# Patient Record
Sex: MALE | Race: ASIAN | NOT HISPANIC OR LATINO | Employment: FULL TIME | URBAN - METROPOLITAN AREA
[De-identification: names, ages, dates, MRNs, and addresses within clinical notes are randomized per-mention and may not be internally consistent; named-entity substitution may affect disease eponyms.]

---

## 2017-12-11 ENCOUNTER — RADIANT APPOINTMENT (OUTPATIENT)
Dept: GENERAL RADIOLOGY | Facility: CLINIC | Age: 27
End: 2017-12-11
Attending: PHYSICIAN ASSISTANT
Payer: COMMERCIAL

## 2017-12-11 ENCOUNTER — OFFICE VISIT (OUTPATIENT)
Dept: URGENT CARE | Facility: URGENT CARE | Age: 27
End: 2017-12-11
Payer: COMMERCIAL

## 2017-12-11 VITALS
OXYGEN SATURATION: 95 % | HEART RATE: 104 BPM | SYSTOLIC BLOOD PRESSURE: 131 MMHG | BODY MASS INDEX: 26.88 KG/M2 | DIASTOLIC BLOOD PRESSURE: 75 MMHG | WEIGHT: 156.6 LBS | TEMPERATURE: 97.8 F

## 2017-12-11 DIAGNOSIS — S59.901A ELBOW INJURY, RIGHT, INITIAL ENCOUNTER: ICD-10-CM

## 2017-12-11 DIAGNOSIS — S69.92XA WRIST INJURY, LEFT, INITIAL ENCOUNTER: ICD-10-CM

## 2017-12-11 DIAGNOSIS — S69.92XA WRIST INJURY, LEFT, INITIAL ENCOUNTER: Primary | ICD-10-CM

## 2017-12-11 PROCEDURE — 73070 X-RAY EXAM OF ELBOW: CPT | Mod: RT

## 2017-12-11 PROCEDURE — 99213 OFFICE O/P EST LOW 20 MIN: CPT | Performed by: PHYSICIAN ASSISTANT

## 2017-12-11 PROCEDURE — 73110 X-RAY EXAM OF WRIST: CPT | Mod: LT

## 2017-12-11 RX ORDER — HYDROCODONE BITARTRATE AND ACETAMINOPHEN 5; 325 MG/1; MG/1
1 TABLET ORAL EVERY 6 HOURS PRN
Qty: 12 TABLET | Refills: 0 | Status: SHIPPED | OUTPATIENT
Start: 2017-12-11 | End: 2018-01-11

## 2017-12-11 RX ORDER — NAPROXEN 500 MG/1
500 TABLET ORAL 2 TIMES DAILY PRN
Qty: 30 TABLET | Refills: 0 | Status: SHIPPED | OUTPATIENT
Start: 2017-12-11 | End: 2023-01-08

## 2017-12-11 NOTE — PROGRESS NOTES
SUBJECTIVE:   Dilia Garcia is a 27 year old male who presents to clinic today for the following health issues:      Arm Pain      Duration: today    Description (location/character/radiation): right arm    Intensity:  moderate    Accompanying signs and symptoms: right arm pain, difficulty moving arm    History (similar episodes/previous evaluation): None    Precipitating or alleviating factors: None    Therapies tried and outcome: None      Slipped in the kitchen this AM. Left writs and R elbow pain. Last tetanus 2015      No Known Allergies    No past medical history on file.      Current Outpatient Prescriptions on File Prior to Visit:  order for DME Equipment being ordered: ankle brace-gel   order for DME Equipment being ordered: crutches   HYDROcodone-acetaminophen (NORCO) 5-325 MG per tablet Take 1 tablet by mouth every 6 hours as needed for moderate to severe pain   ibuprofen (ADVIL,MOTRIN) 400 MG tablet Take 1 tablet (400 mg) by mouth every 6 hours as needed for moderate pain   cyclobenzaprine (FLEXERIL) 10 MG tablet Take 0.5-1 tablets (5-10 mg) by mouth 3 times daily as needed for muscle spasms     No current facility-administered medications on file prior to visit.     Social History   Substance Use Topics     Smoking status: Never Smoker     Smokeless tobacco: Never Used     Alcohol use No       ROS:  Gen: np fevers  Musculoskel: + as above  Skin: as above    OBJECTIVE:  /75 (BP Location: Left arm, Patient Position: Chair, Cuff Size: Adult Regular)  Pulse 104  Temp 97.8  F (36.6  C) (Oral)  Wt 156 lb 9.6 oz (71 kg)  SpO2 95%  BMI 26.88 kg/m2   General:   awake, alert, and cooperative.  NAD.   Head: Normocephalic, atraumatic.  Eyes: Conjunctiva clear,   MS:  Left wrist with tenderness mid dorsal wrist. Moderate swelling. No snuff box tenderness. Mild decreased ROM all planes with pain. Circ./sensation intact/ Small 1cm superficial abrasion ulnar side.  Right elbow- no swelling. Tender volar  cubital fossa  Neuro: Alert and oriented - normal speech.    xrays-  I see no obvious fracture of wrist and elbow.    ASSESSMENT:      ICD-10-CM    1. Wrist injury, left, initial encounter S69.92XA XR Wrist Left G/E 3 Views     order for DME     naproxen (NAPROSYN) 500 MG tablet     HYDROcodone-acetaminophen (NORCO) 5-325 MG per tablet   2. Elbow injury, right, initial encounter S59.901A XR Elbow Right 2 Views     order for DME     naproxen (NAPROSYN) 500 MG tablet     HYDROcodone-acetaminophen (NORCO) 5-325 MG per tablet           PLAN: Arm sling and left wrist splint. Ice. Radiol. report pending. F/U PCP if not better 7-10 days  Advised about symptoms which might herald more serious problems.      Teresa Lua PA-C

## 2017-12-11 NOTE — NURSING NOTE
"Chief Complaint   Patient presents with     Musculoskeletal Problem     Patient complains of right arm pain. Patient had fall and injured both arms       Initial /75 (BP Location: Left arm, Patient Position: Chair, Cuff Size: Adult Regular)  Pulse 104  Temp 97.8  F (36.6  C) (Oral)  Wt 156 lb 9.6 oz (71 kg)  SpO2 95%  BMI 26.88 kg/m2 Estimated body mass index is 26.88 kg/(m^2) as calculated from the following:    Height as of 12/7/15: 5' 4\" (1.626 m).    Weight as of this encounter: 156 lb 9.6 oz (71 kg).  Medication Reconciliation: complete       Larisa Curry    "

## 2017-12-11 NOTE — MR AVS SNAPSHOT
"              After Visit Summary   2017    Dilia Garcia    MRN: [id number]           Patient Information     Date Of Birth          1990        Visit Information        Provider Department      2017 11:10 AM Teresa Lua PA-C [facility]        Today's Diagnoses     Wrist injury, left, initial encounter    -  1    Elbow injury, right, initial encounter           Follow-ups after your visit        Who to contact     If you have questions or need follow up information about today's clinic visit or your schedule please contact [facility] directly at [phone].  Normal or non-critical lab and imaging results will be communicated to you by inZairhart, letter or phone within 4 business days after the clinic has received the results. If you do not hear from us within 7 days, please contact the clinic through inZairhart or phone. If you have a critical or abnormal lab result, we will notify you by phone as soon as possible.  Submit refill requests through [employer] or call your pharmacy and they will forward the refill request to us. Please allow 3 business days for your refill to be completed.          Additional Information About Your Visit        MyChart Information     [employer] lets you send messages to your doctor, view your test results, renew your prescriptions, schedule appointments and more. To sign up, go to [email or web address] . Click on \"Log in\" on the left side of the screen, which will take you to the Welcome page. Then click on \"Sign up Now\" on the right side of the page.     You will be asked to enter the access code listed below, as well as some personal information. Please follow the directions to create your username and password.     Your access code is: [email or web address]  Expires: 3/11/2018 12:54 PM     Your access code will  in 90 days. If you need help or a new code, please call your [facility] or [phone].        Care EveryWhere ID "     This is your Care EveryWhere ID. This could be used by other organizations to access your [place] medical records  [id number]        Your Vitals Were     Pulse Temperature Pulse Oximetry BMI (Body Mass Index)          104 97.8  F (36.6  C) (Oral) 95% 26.88 kg/m2         Blood Pressure from Last 3 Encounters:   12/11/17 131/75   12/07/15 128/80   07/31/15 128/87    Weight from Last 3 Encounters:   12/11/17 156 lb 9.6 oz (71 kg)   12/07/15 185 lb (83.9 kg)   07/31/15 190 lb (86.2 kg)                 Today's Medication Changes          These changes are accurate as of: 12/11/17 12:54 PM.  If you have any questions, ask your nurse or doctor.               Start taking these medicines.        Dose/Directions    naproxen 500 MG tablet   Commonly known as:  NAPROSYN   Used for:  Wrist injury, left, initial encounter, Elbow injury, right, initial encounter   Started by:  Teresa Lua PA-C        Dose:  500 mg   Take 1 tablet (500 mg) by mouth 2 times daily as needed for moderate pain   Quantity:  30 tablet   Refills:  0         These medicines have changed or have updated prescriptions.        Dose/Directions    * HYDROcodone-acetaminophen 5-325 MG per tablet   Commonly known as:  NORCO   This may have changed:  Another medication with the same name was added. Make sure you understand how and when to take each.   Used for:  Ankle injury, left, initial encounter   Changed by:  Alondra Brooks PA-C        Dose:  1 tablet   Take 1 tablet by mouth every 6 hours as needed for moderate to severe pain   Quantity:  30 tablet   Refills:  0       * HYDROcodone-acetaminophen 5-325 MG per tablet   Commonly known as:  NORCO   This may have changed:  You were already taking a medication with the same name, and this prescription was added. Make sure you understand how and when to take each.   Used for:  Wrist injury, left, initial encounter, Elbow injury, right, initial encounter   Changed by:  Teresa Lua  REGLA Murray        Dose:  1 tablet   Take 1 tablet by mouth every 6 hours as needed for moderate to severe pain   Quantity:  12 tablet   Refills:  0       * order for DME   This may have changed:  Another medication with the same name was added. Make sure you understand how and when to take each.   Used for:  Ankle injury, left, initial encounter   Changed by:  Alondra Brooks PA-C        Equipment being ordered: ankle brace-gel   Quantity:  1 Device   Refills:  0       * order for DME   This may have changed:  Another medication with the same name was added. Make sure you understand how and when to take each.   Used for:  Ankle injury, left, initial encounter   Changed by:  Alondra Brooks PA-C        Equipment being ordered: crutches   Quantity:  1 Device   Refills:  0       * order for DME   This may have changed:  You were already taking a medication with the same name, and this prescription was added. Make sure you understand how and when to take each.   Used for:  Elbow injury, right, initial encounter   Changed by:  Teresa Lua PA-C        Dose:  1 Device   1 Device by Device route once for 1 dose Arm sling - use as instructed   Quantity:  1 Device   Refills:  0       * order for DME   This may have changed:  You were already taking a medication with the same name, and this prescription was added. Make sure you understand how and when to take each.   Used for:  Wrist injury, left, initial encounter   Changed by:  Teresa Lua PA-C        Dose:  1 Device   1 Device by Device route once for 1 dose Left wrist brace - use as instructed   Quantity:  1 Device   Refills:  0       * Notice:  This list has 6 medication(s) that are the same as other medications prescribed for you. Read the directions carefully, and ask your doctor or other care provider to review them with you.         Where to get your medicines      These medications were sent to [facility] [place] [employer] [facility]  [place] [facility] [address] [address]  [address], [place] [address]     Phone:  [phone]     naproxen 500 MG tablet         Some of these will need a paper prescription and others can be bought over the counter.  Ask your nurse if you have questions.     Bring a paper prescription for each of these medications     HYDROcodone-acetaminophen 5-325 MG per tablet       You don't need a prescription for these medications     order for DME    order for DME                Primary Care Provider Office Phone # Fax #    [facility] [phone]       [address]  [facility] [address]        Equal Access to Services     MICHAEL LOVE : Hadii aad ku hadasho Soomaali, waaxda luqadaha, qaybta kaalmada adeegyada, waxay idiin haywandy santos . So [facility] [phone].    ATENCIÓN: Si habla español, tiene a sullivan disposición servicios gratuitos de asistencia lingüística. [facility] [phone].    We comply with applicable federal civil rights laws and [place] laws. We do not discriminate on the basis of race, color, national origin, age, disability, sex, sexual orientation, or gender identity.            Thank you!     Thank you for choosing [facility]  for your care. Our goal is always to provide you with excellent care. Hearing back from our patients is one way we can continue to improve our services. Please take a few minutes to complete the written survey that you may receive in the mail after your visit with us. Thank you!             Your Updated Medication List - Protect others around you: Learn how to safely use, store and throw away your medicines at www.disposemymeds.org.          This list is accurate as of: 12/11/17 12:54 PM.  Always use your most recent med list.                   Brand Name Dispense Instructions for use Diagnosis    cyclobenzaprine 10 MG tablet    FLEXERIL    30 tablet    Take 0.5-1 tablets (5-10 mg) by mouth 3 times daily as  needed for muscle spasms    LBP (low back pain)       * HYDROcodone-acetaminophen 5-325 MG per tablet    NORCO    30 tablet    Take 1 tablet by mouth every 6 hours as needed for moderate to severe pain    Ankle injury, left, initial encounter       * HYDROcodone-acetaminophen 5-325 MG per tablet    NORCO    12 tablet    Take 1 tablet by mouth every 6 hours as needed for moderate to severe pain    Wrist injury, left, initial encounter, Elbow injury, right, initial encounter       ibuprofen 400 MG tablet    ADVIL/MOTRIN    45 tablet    Take 1 tablet (400 mg) by mouth every 6 hours as needed for moderate pain    LBP (low back pain)       naproxen 500 MG tablet    NAPROSYN    30 tablet    Take 1 tablet (500 mg) by mouth 2 times daily as needed for moderate pain    Wrist injury, left, initial encounter, Elbow injury, right, initial encounter       * order for DME     1 Device    Equipment being ordered: ankle brace-gel    Ankle injury, left, initial encounter       * order for DME     1 Device    Equipment being ordered: crutches    Ankle injury, left, initial encounter       * order for DME     1 Device    1 Device by Device route once for 1 dose Arm sling - use as instructed    Elbow injury, right, initial encounter       * order for DME     1 Device    1 Device by Device route once for 1 dose Left wrist brace - use as instructed    Wrist injury, left, initial encounter       * Notice:  This list has 6 medication(s) that are the same as other medications prescribed for you. Read the directions carefully, and ask your doctor or other care provider to review them with you.

## 2018-01-02 DIAGNOSIS — S69.92XA WRIST INJURY, LEFT, INITIAL ENCOUNTER: ICD-10-CM

## 2018-01-02 DIAGNOSIS — S59.901A ELBOW INJURY, RIGHT, INITIAL ENCOUNTER: ICD-10-CM

## 2018-01-02 NOTE — TELEPHONE ENCOUNTER
Requested Prescriptions   Pending Prescriptions Disp Refills     naproxen (NAPROSYN) 500 MG tablet  Last Written Prescription Date:  12/11/17  Last Fill Quantity: 30,  # refills: 0   Last Office Visit with G, P or [facility] prescribing provider:  12/07/15   Future Office Visit:    30 tablet 0     Sig: Take 1 tablet (500 mg) by mouth 2 times daily as needed for moderate pain    NSAID Medications Failed    1/2/2018  9:14 AM       Failed - Normal ALT on file in past 12 months    No lab results found.         Failed - Normal AST on file in past 12 months    No lab results found.         Failed - Normal CBC on file in past 12 months    No lab results found.         Failed - Normal serum creatinine on file in past 12 months    No lab results found.         Passed - Blood pressure under 140/90    BP Readings from Last 3 Encounters:   12/11/17 131/75   12/07/15 128/80   07/31/15 128/87                Passed - Recent or future visit with authorizing provider's specialty    Patient had office visit in the last year or has a visit in the next 30 days with authorizing provider.  See chart review.              Passed - Patient is age 6-64 years

## 2018-01-02 NOTE — LETTER
January 9, 2018      Dilia Garcia  [address]  [place] [facility] [address]        Dear Dilia Garcia,      The refill request made by your pharmacy was received at the clinic.     Refused Prescriptions:                       Disp   Refills    naproxen (NAPROSYN) 500 MG tablet          30 tab*0        Sig: Take 1 tablet (500 mg) by mouth 2 times daily as           needed for moderate pain  Refused By: DOV CORREIA  Reason for Refusal: Inappropriate, get more info  Reason for Refusal Comment: due for OV prior to additional refills      At this time you are due in the clinic for a follow up appointment. This medication has been denied until a appointment has been scheduled.    Please call your clinic to make an appointment with your provider before you run out of medication. This will prevent a delay in your next month's refill.    [facility] [address] [phone]    For your convenience we also offer online appointment scheduling at [email or web address] or [email or web address].     We invite you to refill your next prescription at a [facility] or take advantage of our prescription mail service.      Sincerely,    Urgent Care Staff with Teresa Lua

## 2018-01-05 RX ORDER — NAPROXEN 500 MG/1
500 TABLET ORAL 2 TIMES DAILY PRN
Qty: 30 TABLET | Refills: 0 | OUTPATIENT
Start: 2018-01-05

## 2018-01-08 NOTE — TELEPHONE ENCOUNTER
This writer attempted to contact Dilia on 01/08/18      Reason for call patient needs to schedule an appointment before medication can refilled, at this time the medication has been denied until an appointment is made and left message to return call to schedule an appointment.      If patient calls back:   Schedule Office Visit appointment with primary care, document that pt called and close encounter .        Collin Cota

## 2018-01-09 NOTE — TELEPHONE ENCOUNTER
This writer attempted to contact Dilia on 01/09/18      Reason for call informed due to be seen and left detailed message.      If patient calls back:   Patient contacted by 1st floor [facility] Team (MA/TC). Inform patient that someone from the team will contact them, document that pt called and route to care team. .    IF PATIENT CALLS BACK PLEASE HELP SCHEDULE FOLLOW UP APPOINTMENT.     Randolph Giron MA

## 2018-01-09 NOTE — TELEPHONE ENCOUNTER
No appointment scheduled, no call back. Letter is mailed to patient's home address to schedule an appointment before medication can be refilled.  Collin Cota,  [facility]

## 2018-01-11 ENCOUNTER — OFFICE VISIT (OUTPATIENT)
Dept: FAMILY MEDICINE | Facility: CLINIC | Age: 28
End: 2018-01-11
Payer: COMMERCIAL

## 2018-01-11 VITALS
DIASTOLIC BLOOD PRESSURE: 81 MMHG | WEIGHT: 159.7 LBS | SYSTOLIC BLOOD PRESSURE: 129 MMHG | TEMPERATURE: 98 F | BODY MASS INDEX: 26.61 KG/M2 | HEIGHT: 65 IN | HEART RATE: 76 BPM | OXYGEN SATURATION: 96 %

## 2018-01-11 DIAGNOSIS — M65.4 DE QUERVAIN'S DISEASE (TENOSYNOVITIS): ICD-10-CM

## 2018-01-11 DIAGNOSIS — S63.502D SPRAIN OF LEFT WRIST, SUBSEQUENT ENCOUNTER: Primary | ICD-10-CM

## 2018-01-11 PROCEDURE — 99213 OFFICE O/P EST LOW 20 MIN: CPT | Performed by: PHYSICIAN ASSISTANT

## 2018-01-11 RX ORDER — IBUPROFEN 600 MG/1
600 TABLET, FILM COATED ORAL EVERY 6 HOURS PRN
Qty: 30 TABLET | Refills: 1 | Status: SHIPPED | OUTPATIENT
Start: 2018-01-11 | End: 2023-01-08

## 2018-01-11 ASSESSMENT — PAIN SCALES - GENERAL: PAINLEVEL: NO PAIN (0)

## 2018-01-11 NOTE — PROGRESS NOTES
SUBJECTIVE:   Dilia Garcia is a 27 year old male who presents to clinic today for the following health issues:    Joint Pain    Onset: December 17th 2017    Description:   Location: left wrist  Character: Sharp and Cramping    Intensity: severe    Progression of Symptoms: worse    Accompanying Signs & Symptoms:  Other symptoms: numbness and swelling    History:   Previous similar pain: no       Precipitating factors:   Trauma or overuse: YES    Alleviating factors:  Improved by: nothing    Therapies Tried and outcome: nothing              Problem list and histories reviewed & adjusted, as indicated.  Additional history: as documented    Patient Active Problem List   Diagnosis     CARDIOVASCULAR SCREENING; LDL GOAL LESS THAN 160     History reviewed. No pertinent surgical history.    Social History   Substance Use Topics     Smoking status: Never Smoker     Smokeless tobacco: Never Used     Alcohol use No     Family History   Problem Relation Age of Onset     DIABETES No family hx of      Hypertension No family hx of      Colon Cancer No family hx of          Current Outpatient Prescriptions   Medication Sig Dispense Refill     order for DME Equipment being ordered: wrist thumb spika brace 1 Device 0     ibuprofen (ADVIL/MOTRIN) 600 MG tablet Take 1 tablet (600 mg) by mouth every 6 hours as needed for moderate pain 30 tablet 1     naproxen (NAPROSYN) 500 MG tablet Take 1 tablet (500 mg) by mouth 2 times daily as needed for moderate pain 30 tablet 0     order for DME Equipment being ordered: ankle brace-gel 1 Device 0     order for DME Equipment being ordered: crutches 1 Device 0     HYDROcodone-acetaminophen (NORCO) 5-325 MG per tablet Take 1 tablet by mouth every 6 hours as needed for moderate to severe pain 30 tablet 0     cyclobenzaprine (FLEXERIL) 10 MG tablet Take 0.5-1 tablets (5-10 mg) by mouth 3 times daily as needed for muscle spasms 30 tablet 1     No Known Allergies      Reviewed and updated as needed this  "visit by clinical staffTobacco  Allergies  Meds  Med Hx  Surg Hx  Fam Hx  Soc Hx      Reviewed and updated as needed this visit by Provider         ROS:  Constitutional, HEENT, cardiovascular, pulmonary, gi and gu systems are negative, except as otherwise noted.      OBJECTIVE:   /81 (BP Location: Left arm, Patient Position: Chair, Cuff Size: Adult Regular)  Pulse 76  Temp 98  F (36.7  C) (Oral)  Ht 5' 4.57\" (1.64 m)  Wt 159 lb 11.2 oz (72.4 kg)  SpO2 96%  BMI 26.93 kg/m2  Body mass index is 26.93 kg/(m^2).  GENERAL: healthy, alert and no distress  MS: left wrist, mild swelling, no erythema, no bruising at pollicis longus on the radial aspect  DROM with flexion and extension, Finkelstein maneuver is positive    Diagnostic Test Results:  none     ASSESSMENT/PLAN:       ICD-10-CM    1. Sprain of left wrist, subsequent encounter S63.502D order for DME     ibuprofen (ADVIL/MOTRIN) 600 MG tablet   2. De Quervain's disease (tenosynovitis) M65.4      Ibuprofen 600 mg every 6 hours with food for pain  Wear brace with thumb support at all times  Apply ice three times a day 30 min each time  Follow up in 3-4 weeks to reassess       Alondra Brooks PA-C  [facility]  "

## 2018-01-11 NOTE — PATIENT INSTRUCTIONS
Ibuprofen 600 mg every 6 hours with food for pain  Wear brace with thumb support at all times  Apply ice three times a day 30 min each time  Follow up in 3-4 weeks to reassess   Wrist Sprain  A sprain is an injury to the ligaments or capsule that holds a joint together. There are no broken bones. Most sprains take about 3 to 6 weeks to heal. If it a severe sprain where the ligament is completely torn, it can take months to recover.     Most wrist sprains are treated with a splint, wrist brace, or elastic wrap for support. Severe sprains may require surgery.  Home care    Keep your arm elevated to reduce pain and swelling. This is very important during the first 48 hours.    Apply an ice pack over the injured area for 15 to 20 minutes every 3 to 6 hours. You should do this for the first 24 to 48 hours. You can make an ice pack by filling a plastic bag that seals at the top with ice cubes and then wrapping it with a thin towel. Continue to use ice packs for relief of pain and swelling as needed. As the ice melts, be careful to avoid getting your wrap, splint, or cast wet. After 48 hours, apply heat (warm shower or warm bath) for 15 to 20 minutes several times a day, or alternate ice and heat.     You may use over-the-counter pain medicine to control pain, unless another pain medicine was prescribed. If you have chronic liver or kidney disease or ever had a stomach ulcer or GI bleeding, talk with your doctor before using these medicines.    If you were given a splint or brace, wear it for the time advised by your doctor.  Follow-up care  Follow up with your healthcare provider as advised. Any X-rays you had today don t show any broken bones, breaks, or fractures. Sometimes fractures don t show up on the first X-ray. Bruises and sprains can sometimes hurt as much as a fracture. These injuries can take time to heal completely. If your symptoms don t improve or they get worse, talk with your doctor. You may need a repeat  X-ray. If X-rays were taken, you will be told of any new findings that may affect your care.  When to seek medical advice  Call your healthcare provider right away if any of these occur:    Pain or swelling increases    Fingers or hand becomes cold, blue, numb, or tingly  Date Last Reviewed: 11/20/2015 2000-2017 [employer]. [address] [place], [place] [id number]. All rights reserved. This information is not intended as a substitute for professional medical care. Always follow your healthcare professional's instructions.

## 2018-01-11 NOTE — MR AVS SNAPSHOT
After Visit Summary   1/11/2018    Dilia Garcia    MRN: [id number]           Patient Information     Date Of Birth          1990        Visit Information        Provider Department      1/11/2018 11:40 AM Alondra Brooks PA-C [facility]        Today's Diagnoses     Sprain of left wrist, subsequent encounter    -  1      Care Instructions    Ibuprofen 600 mg every 6 hours with food for pain  Wear brace with thumb support at all times  Apply ice three times a day 30 min each time  Follow up in 3-4 weeks to reassess   Wrist Sprain  A sprain is an injury to the ligaments or capsule that holds a joint together. There are no broken bones. Most sprains take about 3 to 6 weeks to heal. If it a severe sprain where the ligament is completely torn, it can take months to recover.     Most wrist sprains are treated with a splint, wrist brace, or elastic wrap for support. Severe sprains may require surgery.  Home care    Keep your arm elevated to reduce pain and swelling. This is very important during the first 48 hours.    Apply an ice pack over the injured area for 15 to 20 minutes every 3 to 6 hours. You should do this for the first 24 to 48 hours. You can make an ice pack by filling a plastic bag that seals at the top with ice cubes and then wrapping it with a thin towel. Continue to use ice packs for relief of pain and swelling as needed. As the ice melts, be careful to avoid getting your wrap, splint, or cast wet. After 48 hours, apply heat (warm shower or warm bath) for 15 to 20 minutes several times a day, or alternate ice and heat.     You may use over-the-counter pain medicine to control pain, unless another pain medicine was prescribed. If you have chronic liver or kidney disease or ever had a stomach ulcer or GI bleeding, talk with your doctor before using these medicines.    If you were given a splint or brace, wear it for the time advised by your doctor.  Follow-up  care  Follow up with your healthcare provider as advised. Any X-rays you had today don t show any broken bones, breaks, or fractures. Sometimes fractures don t show up on the first X-ray. Bruises and sprains can sometimes hurt as much as a fracture. These injuries can take time to heal completely. If your symptoms don t improve or they get worse, talk with your doctor. You may need a repeat X-ray. If X-rays were taken, you will be told of any new findings that may affect your care.  When to seek medical advice  Call your healthcare provider right away if any of these occur:    Pain or swelling increases    Fingers or hand becomes cold, blue, numb, or tingly  Date Last Reviewed: 11/20/2015 2000-2017 [employer]. [address] [place] [address]. All rights reserved. This information is not intended as a substitute for professional medical care. Always follow your healthcare professional's instructions.                Follow-ups after your visit        Your next 10 appointments already scheduled     Jan 11, 2018 11:40 AM CST   Office Visit with Alondra Brooks PA-C   [facility]    [address] [place] [address] [phone]           Bring a current list of meds and any records pertaining to this visit. For Physicals, please bring immunization records and any forms needing to be filled out. Please arrive 10 minutes early to complete paperwork.              Who to contact     If you have questions or need follow up information about today's clinic visit or your schedule please contact [facility] directly at [phone].  Normal or non-critical lab and imaging results will be communicated to you by MyChart, letter or phone within 4 business days after the clinic has received the results. If you do not hear from us within 7 days, please contact the clinic through MyChart or phone. If  "you have a critical or abnormal lab result, we will notify you by phone as soon as possible.  Submit refill requests through [employer] or call your pharmacy and they will forward the refill request to us. Please allow 3 business days for your refill to be completed.          Additional Information About Your Visit        [employer] Information     [employer] lets you send messages to your doctor, view your test results, renew your prescriptions, schedule appointments and more. To sign up, go to [email or web address] . Click on \"Log in\" on the left side of the screen, which will take you to the Welcome page. Then click on \"Sign up Now\" on the right side of the page.     You will be asked to enter the access code listed below, as well as some personal information. Please follow the directions to create your username and password.     Your access code is: [email or web address]  Expires: 3/11/2018 12:54 PM     Your access code will  in 90 days. If you need help or a new code, please call your [facility] clinic or [phone].        Care EveryWhere ID     This is your Care EveryWhere ID. This could be used by other organizations to access your [facility] medical records  [id number]        Your Vitals Were     Pulse Temperature Height Pulse Oximetry BMI (Body Mass Index)       76 98  F (36.7  C) (Oral) 5' 4.57\" (1.64 m) 96% 26.93 kg/m2        Blood Pressure from Last 3 Encounters:   18 129/81   17 131/75   12/07/15 128/80    Weight from Last 3 Encounters:   18 159 lb 11.2 oz (72.4 kg)   17 156 lb 9.6 oz (71 kg)   12/07/15 185 lb (83.9 kg)              Today, you had the following     No orders found for display         Today's Medication Changes          These changes are accurate as of: 18 11:36 AM.  If you have any questions, ask your nurse or doctor.               These medicines have changed or have updated prescriptions.        Dose/Directions    HYDROcodone-acetaminophen 5-325 MG per tablet "   Commonly known as:  NORCO   This may have changed:  Another medication with the same name was removed. Continue taking this medication, and follow the directions you see here.   Used for:  Ankle injury, left, initial encounter   Changed by:  Alondra Brooks PA-C        Dose:  1 tablet   Take 1 tablet by mouth every 6 hours as needed for moderate to severe pain   Quantity:  30 tablet   Refills:  0       ibuprofen 600 MG tablet   Commonly known as:  ADVIL/MOTRIN   This may have changed:    - medication strength  - how much to take   Used for:  Sprain of left wrist, subsequent encounter   Changed by:  Alondra Brooks PA-C        Dose:  600 mg   Take 1 tablet (600 mg) by mouth every 6 hours as needed for moderate pain   Quantity:  30 tablet   Refills:  1       * order for DME   This may have changed:  Another medication with the same name was added. Make sure you understand how and when to take each.   Used for:  Ankle injury, left, initial encounter   Changed by:  Alondra Brooks PA-C        Equipment being ordered: ankle brace-gel   Quantity:  1 Device   Refills:  0       * order for DME   This may have changed:  Another medication with the same name was added. Make sure you understand how and when to take each.   Used for:  Ankle injury, left, initial encounter   Changed by:  Alondra Brooks PA-C        Equipment being ordered: crutches   Quantity:  1 Device   Refills:  0       * order for DME   This may have changed:  You were already taking a medication with the same name, and this prescription was added. Make sure you understand how and when to take each.   Used for:  Sprain of left wrist, subsequent encounter   Changed by:  Alondra Brooks PA-C        Equipment being ordered: wrist thumb spika brace   Quantity:  1 Device   Refills:  0       * Notice:  This list has 3 medication(s) that are the same as other medications prescribed for  you. Read the directions carefully, and ask your doctor or other care provider to review them with you.         Where to get your medicines      These medications were sent to [facility] [address] [facility] - [place], [place] - [address] [address]  [address], [place] [address]     Phone:  [phone]     ibuprofen 600 MG tablet         Some of these will need a paper prescription and others can be bought over the counter.  Ask your nurse if you have questions.     Bring a paper prescription for each of these medications     order for DME                Primary Care Provider Office Phone # Fax #    [facility] [phone]       [address]  [facility] [address]        Equal Access to Services     MICHAEL LOVE : Hadii karey irizarryo Sosocorro, waaxda luqadaha, qaybta kaalmada adeegyada, ranjan ceja. So [facility] [phone].    ATENCIÓN: Si habla español, tiene a sullivan disposición servicios gratuitos de asistencia lingüística. [facility] [phone].    We comply with applicable federal civil rights laws and [place] laws. We do not discriminate on the basis of race, color, national origin, age, disability, sex, sexual orientation, or gender identity.            Thank you!     Thank you for choosing [facility]  for your care. Our goal is always to provide you with excellent care. Hearing back from our patients is one way we can continue to improve our services. Please take a few minutes to complete the written survey that you may receive in the mail after your visit with us. Thank you!             Your Updated Medication List - Protect others around you: Learn how to safely use, store and throw away your medicines at www.disposemymeds.org.          This list is accurate as of: 1/11/18 11:36 AM.  Always use your most recent med list.                   Brand Name Dispense Instructions for use Diagnosis    cyclobenzaprine 10 MG tablet     FLEXERIL    30 tablet    Take 0.5-1 tablets (5-10 mg) by mouth 3 times daily as needed for muscle spasms    LBP (low back pain)       HYDROcodone-acetaminophen 5-325 MG per tablet    NORCO    30 tablet    Take 1 tablet by mouth every 6 hours as needed for moderate to severe pain    Ankle injury, left, initial encounter       ibuprofen 600 MG tablet    ADVIL/MOTRIN    30 tablet    Take 1 tablet (600 mg) by mouth every 6 hours as needed for moderate pain    Sprain of left wrist, subsequent encounter       naproxen 500 MG tablet    NAPROSYN    30 tablet    Take 1 tablet (500 mg) by mouth 2 times daily as needed for moderate pain    Wrist injury, left, initial encounter, Elbow injury, right, initial encounter       * order for DME     1 Device    Equipment being ordered: ankle brace-gel    Ankle injury, left, initial encounter       * order for DME     1 Device    Equipment being ordered: crutches    Ankle injury, left, initial encounter       * order for DME     1 Device    Equipment being ordered: wrist thumb spika brace    Sprain of left wrist, subsequent encounter       * Notice:  This list has 3 medication(s) that are the same as other medications prescribed for you. Read the directions carefully, and ask your doctor or other care provider to review them with you.

## 2020-03-10 ENCOUNTER — HEALTH MAINTENANCE LETTER (OUTPATIENT)
Age: 30
End: 2020-03-10

## 2020-12-27 ENCOUNTER — HEALTH MAINTENANCE LETTER (OUTPATIENT)
Age: 30
End: 2020-12-27

## 2021-04-24 ENCOUNTER — HEALTH MAINTENANCE LETTER (OUTPATIENT)
Age: 31
End: 2021-04-24

## 2021-10-03 ENCOUNTER — HEALTH MAINTENANCE LETTER (OUTPATIENT)
Age: 31
End: 2021-10-03

## 2022-01-13 NOTE — LETTER
[facility]  [address]  [facility] [address]  Phone: [phone]    December 11, 2017        Dilia Garcia  [address]  [place] [address]          To whom it may concern:    RE: Dilia Garcia    Patient was seen and treated today at our clinic. Please excuse from work 12/11/2017    Please contact me for questions or concerns.      Sincerely,        Teresa Lua PA-C   2.5

## 2022-05-15 ENCOUNTER — HEALTH MAINTENANCE LETTER (OUTPATIENT)
Age: 32
End: 2022-05-15

## 2022-09-11 ENCOUNTER — HEALTH MAINTENANCE LETTER (OUTPATIENT)
Age: 32
End: 2022-09-11

## 2023-01-08 ENCOUNTER — OFFICE VISIT (OUTPATIENT)
Dept: URGENT CARE | Facility: URGENT CARE | Age: 33
End: 2023-01-08
Payer: COMMERCIAL

## 2023-01-08 ENCOUNTER — ANCILLARY PROCEDURE (OUTPATIENT)
Dept: GENERAL RADIOLOGY | Facility: CLINIC | Age: 33
End: 2023-01-08
Payer: COMMERCIAL

## 2023-01-08 VITALS
OXYGEN SATURATION: 98 % | TEMPERATURE: 98 F | SYSTOLIC BLOOD PRESSURE: 113 MMHG | HEART RATE: 73 BPM | WEIGHT: 195 LBS | BODY MASS INDEX: 32.89 KG/M2 | DIASTOLIC BLOOD PRESSURE: 78 MMHG

## 2023-01-08 DIAGNOSIS — S49.92XA SHOULDER INJURY, LEFT, INITIAL ENCOUNTER: Primary | ICD-10-CM

## 2023-01-08 DIAGNOSIS — S49.92XA SHOULDER INJURY, LEFT, INITIAL ENCOUNTER: ICD-10-CM

## 2023-01-08 PROCEDURE — 99204 OFFICE O/P NEW MOD 45 MIN: CPT

## 2023-01-08 PROCEDURE — 73030 X-RAY EXAM OF SHOULDER: CPT | Mod: TC | Performed by: RADIOLOGY

## 2023-01-08 RX ORDER — CYCLOBENZAPRINE HCL 10 MG
10 TABLET ORAL 3 TIMES DAILY PRN
Qty: 10 TABLET | Refills: 0 | Status: SHIPPED | OUTPATIENT
Start: 2023-01-08

## 2023-01-08 RX ORDER — MELOXICAM 15 MG/1
15 TABLET ORAL DAILY
Qty: 14 TABLET | Refills: 0 | Status: SHIPPED | OUTPATIENT
Start: 2023-01-08

## 2023-01-08 NOTE — PATIENT INSTRUCTIONS
"      Diagnosis: Muscular strain/sprain injury - possibly the pectoralis major vs subscapularis   Today we did:  Xray:  negative for fracture or dislocation   Plan:   Avoid or restrict any activities that may aggravate your symptoms. Cease exacerbating activity for 2 to 6 weeks, then gradually return to exercise/sport as tolerated.  Work/school/activity note ?   Recommending light stretching (when able to tolerate) to reduce your risks of developing a frozen joint or stiffness in joint   This will also help to increase strength and flexibility over time related to injury   Recovery expected within 2-6 weeks depending on severity, but some may take up to 6-12 months.  If symptoms fail to resolve or worsen recommending follow-up with orthopedics/physical therapy after allowing adequate time for healing. - will place referral today   P.R.I.C.E.  For musculoskeletal injuries including: sprains, strains, bruises - use the acronym P.R.I.C.E. for symptomatic treatment:   Prevent & Protect further injury.  Rest affected area   Ice applied (or heat, or can alternate)   Compression of injured area (ACE bandage/splint).  Elevation of injured area.  Pain  Ibuprofen / tylenol for pain   - Ibuprofen 600mg Q6hr as needed  (can use celebrex if GI upset or GI bleed risk)    - tylenol 500mg Q8hr   - Can use aleve / naproxen / naprosyn also mobic,   Muscle relaxant     No narcotics - no evidence to support this use and people tend to over use \"injured\" extremity when not having pain    (Pain is body's way of making you take it easy for awhile)   - orthopedic speciality will discuss stronger medications if needed indicated at that time  Monitor for:   Worsening pain   Worsening numbness and tingling   Loss of range of motion or strength   Redness, warmth or infection at site   Fevers, chills    "

## 2023-01-08 NOTE — PROGRESS NOTES
URGENT CARE  Assessment & Plan   Assessment:   Dilia Garcia is a 32 year old male who's clinical presentation today is consistent with:   1. Shoulder injury, left, initial encounter  - XR Shoulder Left G/E 3 Views; Future  - cyclobenzaprine (FLEXERIL) 10 MG tablet; Take 1 tablet (10 mg) by mouth 3 times daily as needed for muscle spasms  Dispense: 10 tablet; Refill: 0  - meloxicam (MOBIC) 15 MG tablet; Take 1 tablet (15 mg) by mouth daily  Dispense: 14 tablet; Refill: 0  - Orthopedic  Referral; Future    No alarm signs or symptoms present   Differential Diagnoses for this patient's CC include    fracture, dislocation  Ligamentous vs tendon pathology,   musculoskeletal injury, soft tissue injury    Plan:  Radiologic films today were negative} for fractures or dislocation today, will treat patient at this time symptomatically and supportively, this will include encouraging: using NSAIDs/Tylenol to help decrease pain and inflammation, resting, applying ice/heat as needed, compression and elevation}  Educated patient to follow-up with their PCP or ortho in the next 1-2 weeks for further evaluation and reassessment, and due to the possibility of an occult fracture} also discussed to return immediatly  if symptoms worsen after today's visit.       Patient is agreeable to treatment plan and state they will follow-up if symptoms do not improve and/or if symptoms worsen (see patient's AVS 'monitor for' section for specific patient instructions given and discussed regarding what to watch for and when to follow up)    Medications ordered are listed above, please see AVS for patient's specific and personalized discharge instructions given     MYAH Salcido [facility] [place]      ______________________________________________________________________        Subjective  Subjective     HPI: Dilia Garcia  is a 32 year old  male who presents today for evaluation the following concerns:   Patient  presents endorsing left shoulder pain which started yesterday, on 1/7/23   Patient states this pain is  related to a traumatic injury/accident and is acute. patient states that they were pushing their wife's car out of the ditch yesterday and felt pain in their left pectoral muscle and shoulder.    Patient localizes the pain to the anterior, and states there is no  radiation of the pain to the hands or to the back  patient denies any associated} symptoms such as swelling, redness or bruising   Patient states their: Skin is intact}. ROM is limited but due to pain only, and their strength is normal}   Patient reports sensation is without numbness or tingling.   Self care to this point includes: nothing .   Patient has no history of injury, fracture or surgery to this area of concern in the past.        No Known Allergies  Patient Active Problem List   Diagnosis     CARDIOVASCULAR SCREENING; LDL GOAL LESS THAN 160       Review of Systems:  Pertinent review of systems as reflected in HPI, otherwise negative.     Objective  Objective    Physical Exam:  Vitals:    01/08/23 1210   BP: 113/78   Pulse: 73   Temp: 98  F (36.7  C)   TempSrc: Tympanic   SpO2: 98%   Weight: 88.5 kg (195 lb)      General:   alert and oriented, no acute distress, non-ill-appearing   Vital signs reviewed: afebrile and normotensive     Psy/mental status: pleasant   SKIN: intact   MSK: right} shoulder: no  erythema, ecchymosis/bruising, or   inflammation present    Increased tenderness/pain with palpation on the superior and anterior  aspect  Slight  decreased range of motion with active abduction/adduction,  horizontal abduction, horizontal adduction, flexion, extension, internal rotation, external rotation, or circumduction;   Strength +5,  strength normal.   Temperature  equal} to body temperature.    No crepitus, no gross deformity, joint laxity, skin intact, and no laceration(s) present.         Imaging:   All images were personally read by  this provider (myself).   Per my independent interpretation the xray shows no fractures of dislocation     I explained my diagnostic considerations and recommendations to the patient, who voiced understanding and agreement with the treatment plan.   All questions were answered.   We discussed potential side effects, risks and benefits of any prescribed or recommended therapies, as well as expectations for response to treatments.  Please see AVS for any patient instructions & handouts given.   Patient was advised to contact the Nurse Care Line, their Primary Care provider, Urgent Care, or the Emergency Department if there are new or worsening symptoms, or call 911 for emergencies.        ______________________________________________________________________          Patient Instructions         Diagnosis: Muscular strain/sprain injury - possibly the pectoralis major vs subscapularis   Today we did:  Xray:  negative for fracture or dislocation   Plan:   1. Avoid or restrict any activities that may aggravate your symptoms. Cease exacerbating activity for 2 to 6 weeks, then gradually return to exercise/sport as tolerated.    Work/school/activity note ?     Recommending light stretching (when able to tolerate) to reduce your risks of developing a frozen joint or stiffness in joint     This will also help to increase strength and flexibility over time related to injury     Recovery expected within 2-6 weeks depending on severity, but some may take up to 6-12 months.    If symptoms fail to resolve or worsen recommending follow-up with orthopedics/physical therapy after allowing adequate time for healing. - will place referral today   P.R.I.C.E.  For musculoskeletal injuries including: sprains, strains, bruises - use the acronym P.R.I.C.E. for symptomatic treatment:   1. Prevent & Protect further injury.  2. Rest affected area   3. Ice applied (or heat, or can alternate)   4. Compression of injured area (ACE  "bandage/splint).  5. Elevation of injured area.  Pain  Ibuprofen / tylenol for pain   - Ibuprofen 600mg Q6hr as needed  (can use celebrex if GI upset or GI bleed risk)    - tylenol 500mg Q8hr   - Can use aleve / naproxen / naprosyn also mobic,   Muscle relaxant     No narcotics - no evidence to support this use and people tend to over use \"injured\" extremity when not having pain    (Pain is body's way of making you take it easy for awhile)   - orthopedic speciality will discuss stronger medications if needed indicated at that time  Monitor for:     Worsening pain     Worsening numbness and tingling     Loss of range of motion or strength     Redness, warmth or infection at site     Fevers, chills         "

## 2023-01-08 NOTE — LETTER
[facility] [place]  [address] [address]  Phone: [phone]  Fax: [phone]    January 8, 2023        Dilia Garcia  [address] [facility] [address]          To whom it may concern:    RE: Dilia Garcia    Patient was seen and treated today at our clinic.  Patient may return to work on [employer] 1/15/23.     Please contact me for questions or concerns.      Sincerely,        MYAH Salcido CNP

## 2023-01-20 ENCOUNTER — OFFICE VISIT (OUTPATIENT)
Dept: ORTHOPEDICS | Facility: CLINIC | Age: 33
End: 2023-01-20
Payer: COMMERCIAL

## 2023-01-20 VITALS
WEIGHT: 195 LBS | DIASTOLIC BLOOD PRESSURE: 88 MMHG | SYSTOLIC BLOOD PRESSURE: 137 MMHG | BODY MASS INDEX: 32.89 KG/M2 | HEART RATE: 98 BPM

## 2023-01-20 DIAGNOSIS — S49.92XA SHOULDER INJURY, LEFT, INITIAL ENCOUNTER: ICD-10-CM

## 2023-01-20 DIAGNOSIS — M54.50 ACUTE BILATERAL LOW BACK PAIN WITHOUT SCIATICA: ICD-10-CM

## 2023-01-20 DIAGNOSIS — M54.12 LEFT CERVICAL RADICULOPATHY: Primary | ICD-10-CM

## 2023-01-20 PROCEDURE — 99203 OFFICE O/P NEW LOW 30 MIN: CPT | Performed by: FAMILY MEDICINE

## 2023-01-20 NOTE — LETTER
1/20/2023         RE: Dilia Garcia  [address] [facility] [address]        Dear Colleague,    Thank you for referring your patient, Dilia Garcia, to the [facility]. Please see a copy of my visit note below.    ASSESSMENT & PLAN    Dilia was seen today for pain.    Diagnoses and all orders for this visit:    Left cervical radiculopathy    Shoulder injury, left, initial encounter  -     Orthopedic  Referral    Acute bilateral low back pain without sciatica      This issue is acute and Improving.    # Left Cervical Radiculopathy: Dilia Garcia  was seen today for left . Symptoms had been going on for 2 weeks in the setting of trying to push a car out of a ditch. On examination there are positive findings of mild tenderness to palpation over the trapezius muscles, pain with compression of left cervical nerves. Imaging findings showed normal left shoulder x-rays. Likely cause of patient's condition due to left cervical radiculopathy. Other possible conditions contributing to symptoms include left shoulder pain.  Counseled patient on nature of condition and treatment options.  Given this plan as below, follow-up 2-4 weeks if not improving, sooner if worsening    # Low back Pain: Symptoms noted after same pushing a car.  There is pain on palpation in the low back. Symptoms overall improving.  Given this plan to treat as below follow-up as needed.      Image Findings: normal left shoulder x-rays  Treatment: Activities as tolerated, home exercises given today for neck and low back  Job: As tolerated  Medications/Injections: Can continue Mobic and take flexeril at night only  Follow-up: In 2-4 weeks if symptoms do not improve, sooner if worsening  Can consider further evaluation, imaging, PT         Mac Bond MD  [facility]    -----  Chief Complaint   Patient presents with     Left Shoulder - Pain       SUBJECTIVE  Dilia Garcia  is a/an 32 year old male who is seen in consultation at the request of  Alexandra Velazquez C.N.P. for evaluation of left shoulder pain.     The patient is seen by themselves.  The patient is Right handed    Onset: 1/7/23, ~2 week(s) ago. Patient describes injury as pushing wife's car out of ditch. Patient presented to  1/8/23 and xrays were performed.   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      REVIEW OF SYSTEMS:  Review of Systems  Constitutional, HEENT, cardiovascular, pulmonary, GI, , musculoskeletal, neuro, skin, endocrine and psych systems are negative, except as otherwise noted.    OBJECTIVE:  /88   Pulse 98   Wt 88.5 kg (195 lb)   BMI 32.89 kg/m     General: healthy, alert and in no distress  HEENT: no scleral icterus or conjunctival erythema  Skin: no suspicious lesions or rash. No jaundice.  CV: distal perfusion intact    Resp: normal respiratory effort without conversational dyspnea   Psych: normal mood and affect  Gait: normal steady gait with appropriate coordination and balance    Neuro: Normal light sensory exam of left upper extremity      LEFT SHOULDER  Inspection:    no swelling, bruising, discoloration, or obvious deformity or asymmetry  Palpation:  Nontender.  Active Range of Motion:     Abduction 1800, FF 1800, , IR L1.    Strength:    Scapular plane abduction 5/5,  ER 5/5, IR 5/5, biceps 5/5, triceps 5/5  Special Tests:    Positive: None    Negative: Neer's, Billy', supraspinatus (empty can), Kensett's and Speed's Yergason's    CERVICAL SPINE  Inspection:    normal cervical  lordosis present, rounded shoulders, forward head posture  Palpation:    Nontender.  Range of Motion:     Flexion full    Extension full    Right side bend full    Left side bend full    Right rotation full    Left rotation full  Strength:    Full strength throughout all neck muscles  Special Tests:    Positive: Left Spurlings    Negative: Spurling's (bilateral)    THORACIC/LUMBAR SPINE  Inspection:    No redness, swelling, overlying skin change, gross deformity/asymmetry, scapular winging  Palpation:    Tender about the left para lumbar muscles and right para lumbar muscles. Otherwise remainder of landmarks are nontender.  Range of Motion:     Lumbar flexion full    Lumbar extension full    Right side bend full    Left side bend full    Right rotation full    Left rotation full  Strength:    5/5 - quadriceps, hamstrings, tibialis anterior, gastrocsoleus, and extensor hallicus longus  Special Tests:    Positive: none    Ortho Exam      RADIOLOGY:  I independently, visualized and reviewed these images with the patient    EXAM: XR SHOULDER LEFT G/E 3 VIEWS  LOCATION: [facility]  DATE/TIME: 1/8/2023 12:27 PM     INDICATION: Pain after injury  COMPARISON: None.                                                                      IMPRESSION: Negative left shoulder.    Review of external notes as documented elsewhere in note  Review of the result(s) of each unique test - left shoulder x-rays        Disclaimer: This note consists of symbols derived from keyboarding, dictation and/or voice recognition software. As a result, there may be errors in the script that have gone undetected. Please consider this when interpreting information found in this chart.      Again, thank you for allowing me to participate in the care of your patient.        Sincerely,        Mac Bond MD

## 2023-01-20 NOTE — PATIENT INSTRUCTIONS
# Left Cervical Radiculopathy: Dilia Garcia  was seen today for left . Symptoms had been going on for 2 weeks in the setting of trying to push a car out of a ditch. On examination there are positive findings of mild tenderness to palpation over the trapezius muscles, pain with compression of left cervical nerves. Imaging findings showed normal left shoulder x-rays. Likely cause of patient's condition due to left cervical radiculopathy. Other possible conditions contributing to symptoms include left shoulder pain.  Counseled patient on nature of condition and treatment options.  Given this plan as below, follow-up 2-4 weeks if not improving, sooner if worsening     Image Findings: normal left shoulder x-rays  Treatment: Activities as tolerated, home exercises given today  Job: As tolerated  Medications/Injections: Can continue Mobic and take flexeril at night only  Follow-up: In 2-4 weeks if symptoms do not improve, sooner if worsening  Can consider further evaluation, imaging, PT    Please call [phone]   Ask for my team if you have any questions or concerns    If you have not yet received the influenza vaccine but would like to get one, please call  [phone] or you can schedule via [email or web address]    It was great seeing you today!    Mac Bond MD, CAQSM

## 2023-01-20 NOTE — PROGRESS NOTES
ASSESSMENT & PLAN    Dilia was seen today for pain.    Diagnoses and all orders for this visit:    Left cervical radiculopathy    Shoulder injury, left, initial encounter  -     Orthopedic  Referral    Acute bilateral low back pain without sciatica      This issue is acute and Improving.    # Left Cervical Radiculopathy: Dilia Garcia  was seen today for left . Symptoms had been going on for 2 weeks in the setting of trying to push a car out of a ditch. On examination there are positive findings of mild tenderness to palpation over the trapezius muscles, pain with compression of left cervical nerves. Imaging findings showed normal left shoulder x-rays. Likely cause of patient's condition due to left cervical radiculopathy. Other possible conditions contributing to symptoms include left shoulder pain.  Counseled patient on nature of condition and treatment options.  Given this plan as below, follow-up 2-4 weeks if not improving, sooner if worsening    # Low back Pain: Symptoms noted after same pushing a car.  There is pain on palpation in the low back. Symptoms overall improving.  Given this plan to treat as below follow-up as needed.      Image Findings: normal left shoulder x-rays  Treatment: Activities as tolerated, home exercises given today for neck and low back  Job: As tolerated  Medications/Injections: Can continue Mobic and take flexeril at night only  Follow-up: In 2-4 weeks if symptoms do not improve, sooner if worsening  Can consider further evaluation, imaging, PT         Mac Bond MD  [facility] [place]    -----  Chief Complaint   Patient presents with     Left Shoulder - Pain       SUBJECTIVE  Dilia Garcia is a/an 32 year old male who is seen in consultation at the request of  Alexandra Velazquez C.N.P. for evaluation of left shoulder pain.     The patient is seen by themselves.  The patient is Right handed    Onset: 1/7/23, ~2 week(s) ago. Patient describes injury as  pushing wife's car out of ditch. Patient presented to  1/8/23 and xrays were performed.   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      REVIEW OF SYSTEMS:  Review of Systems  Constitutional, HEENT, cardiovascular, pulmonary, GI, , musculoskeletal, neuro, skin, endocrine and psych systems are negative, except as otherwise noted.    OBJECTIVE:  /88   Pulse 98   Wt 88.5 kg (195 lb)   BMI 32.89 kg/m     General: healthy, alert and in no distress  HEENT: no scleral icterus or conjunctival erythema  Skin: no suspicious lesions or rash. No jaundice.  CV: distal perfusion intact    Resp: normal respiratory effort without conversational dyspnea   Psych: normal mood and affect  Gait: normal steady gait with appropriate coordination and balance    Neuro: Normal light sensory exam of left upper extremity      LEFT SHOULDER  Inspection:    no swelling, bruising, discoloration, or obvious deformity or asymmetry  Palpation:  Nontender.  Active Range of Motion:     Abduction 1800, FF 1800, , IR L1.    Strength:    Scapular plane abduction 5/5,  ER 5/5, IR 5/5, biceps 5/5, triceps 5/5  Special Tests:    Positive: None    Negative: Neer's, Billy', supraspinatus (empty can), Argyle's and Speed's, Yergason's    CERVICAL SPINE  Inspection:    normal cervical lordosis present, rounded shoulders, forward head posture  Palpation:    Nontender.  Range of Motion:     Flexion full    Extension full    Right side bend full    Left side bend full    Right rotation full    Left rotation full  Strength:    Full strength  throughout all neck muscles  Special Tests:    Positive: Left Spurlings    Negative: Spurling's (bilateral)    THORACIC/LUMBAR SPINE  Inspection:    No redness, swelling, overlying skin change, gross deformity/asymmetry, scapular winging  Palpation:    Tender about the left para lumbar muscles and right para lumbar muscles. Otherwise remainder of landmarks are nontender.  Range of Motion:     Lumbar flexion full    Lumbar extension full    Right side bend full    Left side bend full    Right rotation full    Left rotation full  Strength:    5/5 - quadriceps, hamstrings, tibialis anterior, gastrocsoleus, and extensor hallicus longus  Special Tests:    Positive: none    Ortho Exam      RADIOLOGY:  I independently, visualized and reviewed these images with the patient    EXAM: XR SHOULDER LEFT G/E 3 VIEWS  LOCATION: [facility]  DATE/TIME: 1/8/2023 12:27 PM     INDICATION: Pain after injury  COMPARISON: None.                                                                      IMPRESSION: Negative left shoulder.    Review of external notes as documented elsewhere in note  Review of the result(s) of each unique test - left shoulder x-rays        Disclaimer: This note consists of symbols derived from keyboarding, dictation and/or voice recognition software. As a result, there may be errors in the script that have gone undetected. Please consider this when interpreting information found in this chart.

## 2023-06-03 ENCOUNTER — HEALTH MAINTENANCE LETTER (OUTPATIENT)
Age: 33
End: 2023-06-03

## 2023-12-28 ENCOUNTER — OFFICE VISIT (OUTPATIENT)
Dept: URGENT CARE | Facility: URGENT CARE | Age: 33
End: 2023-12-28
Payer: COMMERCIAL

## 2023-12-28 VITALS
HEART RATE: 79 BPM | RESPIRATION RATE: 16 BRPM | BODY MASS INDEX: 33.56 KG/M2 | TEMPERATURE: 97.7 F | DIASTOLIC BLOOD PRESSURE: 86 MMHG | SYSTOLIC BLOOD PRESSURE: 125 MMHG | OXYGEN SATURATION: 95 % | WEIGHT: 199 LBS

## 2023-12-28 DIAGNOSIS — L30.9 DERMATITIS: Primary | ICD-10-CM

## 2023-12-28 PROCEDURE — 99213 OFFICE O/P EST LOW 20 MIN: CPT

## 2023-12-28 RX ORDER — DIPHENHYDRAMINE HCL 50 MG
50 CAPSULE ORAL AT BEDTIME
Qty: 5 CAPSULE | Refills: 0 | Status: CANCELLED | OUTPATIENT
Start: 2023-12-28 | End: 2024-01-02

## 2023-12-28 RX ORDER — CETIRIZINE HYDROCHLORIDE 10 MG/1
10 TABLET ORAL DAILY
Qty: 14 TABLET | Refills: 0 | Status: CANCELLED | OUTPATIENT
Start: 2023-12-28 | End: 2024-01-11

## 2023-12-28 RX ORDER — TRIAMCINOLONE ACETONIDE 5 MG/G
1 OINTMENT TOPICAL 2 TIMES DAILY
Qty: 120 G | Refills: 0 | Status: SHIPPED | OUTPATIENT
Start: 2023-12-28

## 2023-12-28 RX ORDER — METHYLPREDNISOLONE 4 MG
TABLET, DOSE PACK ORAL
Qty: 21 TABLET | Refills: 0 | Status: SHIPPED | OUTPATIENT
Start: 2023-12-28

## 2023-12-28 NOTE — PROGRESS NOTES
URGENT CARE  Assessment & Plan   Assessment:   Dilia Garcia is a 33 year old male who's clinical presentation today is consistent with:   1. Dermatitis  - methylPREDNISolone (MEDROL DOSEPAK)  - triamcinolone (KENALOG) 0.5 % external ointment;   - Adult Dermatology  Referral; Future  Plan:  Will treat patient's dermatitis} supportively and symptomatically with avoidance of the irritant and adoption of protective measures, along with emollients and moisture; given patient has already tried conservative treatments will also prescribe patient a topical steroid cream and oral steroid. If no improvement (or if worsening) recommend patient follow up with their Pcp in the next 7-10 days.   - derm referral placed   No alarm signs or symptoms present   Differential Diagnoses for this patient's chief complaint that I considered include:  Atopic vs allergic vs contact dermatitis, cellulitis, Insect bite/sting, drug reaction, eczema, psoriasis, scabies, tinea      Patient is agreeable to treatment plan and state they will follow-up if symptoms do not improve and/or if symptoms worsen   see patient's AVS 'monitor for' section for specific patient instructions given and discussed regarding what to watch for and when to follow up    MYAH Salcido [facility]RGENT [facility] [place]      ______________________________________________________________________      Subjective     HPI: Dilia Garcia  is a 33 year old  male who presents today for evaluation the following concerns:   Patient reports a rash noted to his BUE and trunk, which started about six days ago    Patient endorses the rash is  pruritic.   Patient states the rash is: Pink, irritative and itchy, small pin point bumps    Patient denies any inflammation, vesicles or blisters.  Patient denies any warmth to the site  Patient denies any drainage, or abscess formation   Patient's rash began gradually}, Since onset rash has had  spread}   Patient states they  have tried} over-the-counter such as Benadryl, antihistamines such as Zyrtec or anti-itch creams such as hydrocortisone.    Patient states they have never} had a rash like this before. But endorses he used to get hives as a child from cold weather   Denies triggers such as: any changes to clothing, laundry detergents, soaps, or other new household items that might have come in contact with the patient; known exposure to insect, plant, drug exposure (including OTC, alternative medications, or illicit drugs), contact with persons who are knowingly ill, occupational/chemical exposures, tightness or swelling in the tongue/throat/neck.   Patient denies any constitutional symptoms, respiratory difficulty, neither is rash associated w/ fever, arthralgias, URI symptoms, or other recent viral  syndromes. No change in speaking or breathing reported     Review of Systems:  Pertinent review of systems as reflected in HPI, otherwise negative.     Objective    Physical Exam:  Vitals:    12/28/23 1541   BP: 125/86   Pulse: 79   Resp: 16   Temp: 97.7  F (36.5  C)   TempSrc: Tympanic   SpO2: 95%   Weight: 90.3 kg (199 lb)      General:   alert and oriented, no acute distress, non}ill-appearing   Vital signs reviewed: afebrile and normotensive     Psy/mental status: pleasant   SKIN:   BUEs and  trunk :   Noted diffuse, slightly raised, maculopapular lesions that are erythematous and generalized,  Lesions are pinpoint in size   No  Vesicles bullae    Rash does not have any warmth noted, no edema,.     No drainage noted, no signs of infection   No abscess seen     ______________________________________________________________________    I explained my diagnostic considerations and recommendations to the patient, who voiced understanding and agreement with the treatment plan.   All questions were answered.   We discussed potential side effects, risks and benefits of any prescribed or recommended therapies, as well as expectations for  response to treatments.  Please see AVS for any patient instructions & handouts given.   Patient was advised to contact the Nurse Care Line, their Primary Care provider, Urgent Care, or the Emergency Department if there are new or worsening symptoms, or call 911 for emergencies.

## 2023-12-28 NOTE — PATIENT INSTRUCTIONS
Diagnosis: contact dermatitis - skin rash   Plan:   Itch relief : steroid cream    Cool compress,   Benadryl or antihistamine at night daily   Oral steroid if severe}   Supportive   Keep rash open, do not cover, air helps to dry rash   Wear loose clothing   Avoid soaps, harsh chemicals   Prevention   Try to identify irritant and avoid   Follow up     Monitor for:   Fever of 101 F  Redness or swelling that gets worse  Pain that gets worse. Babies may show pain with fussiness that can't be soothed.  Bad-smelling fluid leaking from the skin  New rash on other parts of the body. This includes around the eyes, mouth, or genitals.  Difficulty breathing or shortness of breath         Contact Dermatitis:   Contact dermatitis is a skin rash caused by something that touches the skin and makes it irritated and inflamed.    skin may be red, swollen, dry, and cracked. Blisters may form and ooze. The rash will itch.  Contact dermatitis can form anywhere on body.   It can be caused from exposure to animals, soaps and detergents, plants, such as poison ivy, oak, or sumac.    Other common causes are metals such as jewelry or new skin creams   Contact dermatitis is not passed from person to person.      Long-term risks of topical corticosteroids:    - skin atrophy (thinning), telangiectasia, striae (stretch marks), glaucoma, adrenocortical suppression,   rebound flare, steroid withdrawal,    Do not apply to thin skin areas such as face or groin     Common triggers: harsh soaps, detergents,    Wool, abrasive fabrics, tight-fitting clothing ,    Chemicals: formaldehyde, airborne irrit    (smk, tobacco, air pollution)    And extreme transitions in temp, cold temperatures    Hot water

## 2023-12-29 ENCOUNTER — TELEPHONE (OUTPATIENT)
Dept: DERMATOLOGY | Facility: CLINIC | Age: 33
End: 2023-12-29
Payer: COMMERCIAL

## 2023-12-29 NOTE — TELEPHONE ENCOUNTER
Left message for pt to call back for a sooner appt. Will need to fit in with Dr. Espinosa.  Yanira DOMINGUEZ RN BSN PHN  Specialty Clinics

## 2023-12-29 NOTE — TELEPHONE ENCOUNTER
This encounter is being sent to inform the clinic that this patient has a referral from Alexandra Velazquez CNP for the diagnoses of Rash and has requested that this patient be seen within 1-2 weeks. Based on the availability of our provider(s), we are unable to accommodate this request.    Were all sites offered this patient?  Yes. Patient prefers [place].    Does scheduling algorithm request to schedule next available?  Patient has been scheduled for the first available opening with Katlyn Monterroso PA-C on 6/3/2024.  We have informed the patient that the clinic will review their referral and reach out if a sooner appointment is medically necessary.

## 2024-01-02 ENCOUNTER — TELEPHONE (OUTPATIENT)
Dept: DERMATOLOGY | Facility: CLINIC | Age: 34
End: 2024-01-02
Payer: COMMERCIAL

## 2024-01-02 ENCOUNTER — OFFICE VISIT (OUTPATIENT)
Dept: DERMATOLOGY | Facility: CLINIC | Age: 34
End: 2024-01-02
Payer: COMMERCIAL

## 2024-01-02 DIAGNOSIS — L30.9 DERMATITIS: ICD-10-CM

## 2024-01-02 DIAGNOSIS — L20.9 ATOPIC DERMATITIS, UNSPECIFIED TYPE: Primary | ICD-10-CM

## 2024-01-02 PROCEDURE — 99203 OFFICE O/P NEW LOW 30 MIN: CPT | Performed by: PHYSICIAN ASSISTANT

## 2024-01-02 RX ORDER — TRIAMCINOLONE ACETONIDE 1 MG/G
CREAM TOPICAL 2 TIMES DAILY
Qty: 80 G | Refills: 3 | Status: SHIPPED | OUTPATIENT
Start: 2024-01-02

## 2024-01-02 ASSESSMENT — PAIN SCALES - GENERAL: PAINLEVEL: NO PAIN (0)

## 2024-01-02 NOTE — TELEPHONE ENCOUNTER
Patient Contact    Attempt # 2    Was call answered?  No    Called patient. No answer. Left message to call back. Clinic number was provided.     Mia Rene RN    [facility]   [phone]

## 2024-01-02 NOTE — LETTER
1/2/2024         RE: Dilia Garcia  [address] [address]        Dear Colleague,    Thank you for referring your patient, Dilia Garcia, to the [facility]. Please see a copy of my visit note below.    Dilia Garcia is an extremely pleasant 33 year old year old male patient here today for rash on arms, torso. Present for past week. He notes itchy and burning. He has been using native soap and no moisturizers. He notes his children have eczema. Patient has no other skin complaints today.  Remainder of the HPI, Meds, PMH, Allergies, FH, and SH was reviewed in chart.      No past medical history on file.    No past surgical history on file.     Family History   Problem Relation Age of Onset     Diabetes No family hx of      Hypertension No family hx of      Colon Cancer No family hx of        Social History     Socioeconomic History     Marital status:      Spouse name: Not on file     Number of children: Not on file     Years of education: Not on file     Highest education level: Not on file   Occupational History     Not on file   Tobacco Use     Smoking status: Never     Smokeless tobacco: Never   Substance and Sexual Activity     Alcohol use: No     Drug use: No     Sexual activity: Yes     Partners: Female   Other Topics Concern     Parent/sibling w/ CABG, MI or angioplasty before 65F 55M? Not Asked   Social History Narrative     Not on file     Social Determinants of Health     Financial Resource Strain: Not on file   Food Insecurity: Not on file   Transportation Needs: Not on file   Physical Activity: Not on file   Stress: Not on file   Social Connections: Not on file   Interpersonal Safety: Not on file   Housing Stability: Not on file       Outpatient Encounter Medications as of 1/2/2024   Medication Sig Dispense Refill     triamcinolone (KENALOG) 0.1 % external cream Apply topically 2 times daily Apply twice daily to rash on arms, body for two weeks. 80 g 3      cyclobenzaprine (FLEXERIL) 10 MG tablet Take 1 tablet (10 mg) by mouth 3 times daily as needed for muscle spasms 10 tablet 0     meloxicam (MOBIC) 15 MG tablet Take 1 tablet (15 mg) by mouth daily 14 tablet 0     methylPREDNISolone (MEDROL DOSEPAK) 4 MG tablet therapy pack Follow Package Directions 21 tablet 0     order for DME Equipment being ordered: wrist thumb spika brace 1 Device 0     order for DME Equipment being ordered: ankle brace-gel 1 Device 0     order for DME Equipment being ordered: crutches 1 Device 0     triamcinolone (KENALOG) 0.5 % external ointment Apply 1 g topically 2 times daily 120 g 0     No facility-administered encounter medications on file as of 1/2/2024.             O:   NAD, WDWN, Alert & Oriented, Mood & Affect wnl, Vitals stable   Here today alone   There were no vitals taken for this visit.   General appearance normal   Vitals stable   Alert, oriented and in no acute distress      Small eczematous plaques on anterior shoulder and antecubital fossa and papules on torso       Eyes: Conjunctivae/lids:Normal     ENT: Lips: normal    MSK:Normal    Pulm: Breathing Normal    Neuro/Psych: Orientation:Alert and Orientedx3 ; Mood/Affect:normal   A/P:  1. Atopic dermatitis-eczema  Apply triamcinolone twice daily for two weeks then prn.   Skin care regimen reviewed with patient: Eliminate harsh soaps, i.e. Dial, zest, irsih spring; Mild soaps such as Cetaphil or Dove sensitive skin, avoid hot or cold showers, aggressive use of emollients including vanicream, cetaphil, eucerin or cerave discussed with patient.    Return to clinic if not improving.       Again, thank you for allowing me to participate in the care of your patient.        Sincerely,        Emilia Fregoso PA-C

## 2024-01-02 NOTE — NURSING NOTE
Chief Complaint   Patient presents with    Rash     On arm and back, started last Saturday 12/23/2.3, had pain but no longer        There were no vitals filed for this visit.  Wt Readings from Last 1 Encounters:   12/28/23 90.3 kg (199 lb)       Brigitte Benavides LPN .................1/2/2024

## 2024-01-02 NOTE — TELEPHONE ENCOUNTER
Reason for Call:  Other returning call    Detailed comments: Pt returning call from Mia.    Phone Number Patient can be reached at: Cell number on file:    Telephone Information:   Mobile [phone]       Best Time:       Can we leave a detailed message on this number? YES    Call taken on 1/2/2024 at 10:33 AM by Marium Oviedo MA

## 2024-01-03 NOTE — PROGRESS NOTES
Dilia Garcia is an extremely pleasant 33 year old year old male patient here today for rash on arms, torso. Present for past week. He notes itchy and burning. He has been using native soap and no moisturizers. He notes his children have eczema. Patient has no other skin complaints today.  Remainder of the HPI, Meds, PMH, Allergies, FH, and SH was reviewed in chart.      No past medical history on file.    No past surgical history on file.     Family History   Problem Relation Age of Onset    Diabetes No family hx of     Hypertension No family hx of     Colon Cancer No family hx of        Social History     Socioeconomic History    Marital status:      Spouse name: Not on file    Number of children: Not on file    Years of education: Not on file    Highest education level: Not on file   Occupational History    Not on file   Tobacco Use    Smoking status: Never    Smokeless tobacco: Never   Substance and Sexual Activity    Alcohol use: No    Drug use: No    Sexual activity: Yes     Partners: Female   Other Topics Concern    Parent/sibling w/ CABG, MI or angioplasty before 65F 55M? Not Asked   Social History Narrative    Not on file     Social Determinants of Health     Financial Resource Strain: Not on file   Food Insecurity: Not on file   Transportation Needs: Not on file   Physical Activity: Not on file   Stress: Not on file   Social Connections: Not on file   Interpersonal Safety: Not on file   Housing Stability: Not on file       Outpatient Encounter Medications as of 1/2/2024   Medication Sig Dispense Refill    triamcinolone (KENALOG) 0.1 % external cream Apply topically 2 times daily Apply twice daily to rash on arms, body for two weeks. 80 g 3    cyclobenzaprine (FLEXERIL) 10 MG tablet Take 1 tablet (10 mg) by mouth 3 times daily as needed for muscle spasms 10 tablet 0    meloxicam (MOBIC) 15 MG tablet Take 1 tablet (15 mg) by mouth daily 14 tablet 0    methylPREDNISolone (MEDROL DOSEPAK) 4 MG tablet  therapy pack Follow Package Directions 21 tablet 0    order for DME Equipment being ordered: wrist thumb spika brace 1 Device 0    order for DME Equipment being ordered: ankle brace-gel 1 Device 0    order for DME Equipment being ordered: crutches 1 Device 0    triamcinolone (KENALOG) 0.5 % external ointment Apply 1 g topically 2 times daily 120 g 0     No facility-administered encounter medications on file as of 1/2/2024.             O:   NAD, WDWN, Alert & Oriented, Mood & Affect wnl, Vitals stable   Here today alone   There were no vitals taken for this visit.   General appearance normal   Vitals stable   Alert, oriented and in no acute distress      Small eczematous plaques on anterior shoulder and antecubital fossa and papules on torso       Eyes: Conjunctivae/lids:Normal     ENT: Lips: normal    MSK:Normal    Pulm: Breathing Normal    Neuro/Psych: Orientation:Alert and Orientedx3 ; Mood/Affect:normal   A/P:  1. Atopic dermatitis-eczema  Apply triamcinolone twice daily for two weeks then prn.   Skin care regimen reviewed with patient: Eliminate harsh soaps, i.e. Dial, zest, irsih spring; Mild soaps such as Cetaphil or Dove sensitive skin, avoid hot or cold showers, aggressive use of emollients including vanicream, cetaphil, eucerin or cerave discussed with patient.    Return to clinic if not improving.

## 2024-07-07 ENCOUNTER — HEALTH MAINTENANCE LETTER (OUTPATIENT)
Age: 34
End: 2024-07-07

## 2025-05-21 ENCOUNTER — HOSPITAL ENCOUNTER (EMERGENCY)
Facility: CLINIC | Age: 35
Discharge: LEFT BEFORE DISCHARGE | End: 2025-05-21
Admitting: EMERGENCY MEDICINE
Payer: COMMERCIAL

## 2025-05-21 VITALS
BODY MASS INDEX: 32.95 KG/M2 | SYSTOLIC BLOOD PRESSURE: 144 MMHG | RESPIRATION RATE: 12 BRPM | OXYGEN SATURATION: 97 % | WEIGHT: 193 LBS | DIASTOLIC BLOOD PRESSURE: 95 MMHG | HEIGHT: 64 IN | HEART RATE: 74 BPM | TEMPERATURE: 98 F

## 2025-05-21 PROCEDURE — 99281 EMR DPT VST MAYX REQ PHY/QHP: CPT

## 2025-05-21 ASSESSMENT — COLUMBIA-SUICIDE SEVERITY RATING SCALE - C-SSRS
6. HAVE YOU EVER DONE ANYTHING, STARTED TO DO ANYTHING, OR PREPARED TO DO ANYTHING TO END YOUR LIFE?: NO
1. IN THE PAST MONTH, HAVE YOU WISHED YOU WERE DEAD OR WISHED YOU COULD GO TO SLEEP AND NOT WAKE UP?: NO
2. HAVE YOU ACTUALLY HAD ANY THOUGHTS OF KILLING YOURSELF IN THE PAST MONTH?: NO

## 2025-07-19 ENCOUNTER — HEALTH MAINTENANCE LETTER (OUTPATIENT)
Age: 35
End: 2025-07-19